# Patient Record
Sex: MALE | Race: WHITE | Employment: FULL TIME | URBAN - METROPOLITAN AREA
[De-identification: names, ages, dates, MRNs, and addresses within clinical notes are randomized per-mention and may not be internally consistent; named-entity substitution may affect disease eponyms.]

---

## 2021-10-21 PROCEDURE — 88305 TISSUE EXAM BY PATHOLOGIST: CPT

## 2021-10-22 ENCOUNTER — HOSPITAL ENCOUNTER (OUTPATIENT)
Dept: LAB | Age: 61
Discharge: HOME | End: 2021-10-22

## 2022-06-23 DIAGNOSIS — Z00.00 WELL ADULT EXAM: ICD-10-CM

## 2022-06-23 DIAGNOSIS — Z13.220 LIPID SCREENING: Primary | ICD-10-CM

## 2022-07-26 ENCOUNTER — OFFICE VISIT (OUTPATIENT)
Dept: FAMILY MEDICINE CLINIC | Facility: CLINIC | Age: 62
End: 2022-07-26
Payer: COMMERCIAL

## 2022-07-26 VITALS
WEIGHT: 132.4 LBS | OXYGEN SATURATION: 96 % | SYSTOLIC BLOOD PRESSURE: 110 MMHG | DIASTOLIC BLOOD PRESSURE: 66 MMHG | RESPIRATION RATE: 16 BRPM | HEIGHT: 70 IN | HEART RATE: 87 BPM | BODY MASS INDEX: 18.96 KG/M2

## 2022-07-26 DIAGNOSIS — R19.7 ACUTE DIARRHEA: ICD-10-CM

## 2022-07-26 DIAGNOSIS — R10.9 ABDOMINAL CRAMPING: ICD-10-CM

## 2022-07-26 DIAGNOSIS — R12 HEARTBURN: ICD-10-CM

## 2022-07-26 DIAGNOSIS — Z87.891 PERSONAL HISTORY OF TOBACCO USE: ICD-10-CM

## 2022-07-26 DIAGNOSIS — R17 JAUNDICE: Primary | ICD-10-CM

## 2022-07-26 DIAGNOSIS — R93.2 ABNORMAL LIVER ULTRASOUND: ICD-10-CM

## 2022-07-26 DIAGNOSIS — R79.89 ELEVATED LFTS: ICD-10-CM

## 2022-07-26 DIAGNOSIS — Z13.220 LIPID SCREENING: ICD-10-CM

## 2022-07-26 LAB
BASOPHILS # BLD: 0.1 K/UL (ref 0–0.2)
BASOPHILS NFR BLD: 1 % (ref 0–2)
BILIRUBIN, URINE, POC: ABNORMAL
BLOOD URINE, POC: NEGATIVE
DIFFERENTIAL METHOD BLD: ABNORMAL
EOSINOPHIL # BLD: 0.2 K/UL (ref 0–0.8)
EOSINOPHIL NFR BLD: 2 % (ref 0.5–7.8)
ERYTHROCYTE [DISTWIDTH] IN BLOOD BY AUTOMATED COUNT: 14.6 % (ref 11.9–14.6)
GLUCOSE URINE, POC: NEGATIVE
HCT VFR BLD AUTO: 44.8 % (ref 41.1–50.3)
HGB BLD-MCNC: 15.3 G/DL (ref 13.6–17.2)
IMM GRANULOCYTES # BLD AUTO: 0 K/UL (ref 0–0.5)
IMM GRANULOCYTES NFR BLD AUTO: 0 % (ref 0–5)
KETONES, URINE, POC: ABNORMAL
LEUKOCYTE ESTERASE, URINE, POC: NEGATIVE
LYMPHOCYTES # BLD: 3.9 K/UL (ref 0.5–4.6)
LYMPHOCYTES NFR BLD: 35 % (ref 13–44)
MCH RBC QN AUTO: 31.7 PG (ref 26.1–32.9)
MCHC RBC AUTO-ENTMCNC: 34.2 G/DL (ref 31.4–35)
MCV RBC AUTO: 92.9 FL (ref 79.6–97.8)
MONOCYTES # BLD: 1.1 K/UL (ref 0.1–1.3)
MONOCYTES NFR BLD: 9 % (ref 4–12)
NEUTS SEG # BLD: 6 K/UL (ref 1.7–8.2)
NEUTS SEG NFR BLD: 53 % (ref 43–78)
NITRITE, URINE, POC: NEGATIVE
NRBC # BLD: 0 K/UL (ref 0–0.2)
PH, URINE, POC: 7 (ref 4.6–8)
PLATELET # BLD AUTO: 323 K/UL (ref 150–450)
PMV BLD AUTO: 10.8 FL (ref 9.4–12.3)
PROTEIN,URINE, POC: NEGATIVE
RBC # BLD AUTO: 4.82 M/UL (ref 4.23–5.6)
SPECIFIC GRAVITY, URINE, POC: 1.02 (ref 1–1.03)
URINALYSIS CLARITY, POC: ABNORMAL
URINALYSIS COLOR, POC: ABNORMAL
UROBILINOGEN, POC: ABNORMAL
WBC # BLD AUTO: 11.3 K/UL (ref 4.3–11.1)

## 2022-07-26 PROCEDURE — 81003 URINALYSIS AUTO W/O SCOPE: CPT | Performed by: FAMILY MEDICINE

## 2022-07-26 PROCEDURE — 99214 OFFICE O/P EST MOD 30 MIN: CPT | Performed by: FAMILY MEDICINE

## 2022-07-26 PROCEDURE — G0296 VISIT TO DETERM LDCT ELIG: HCPCS | Performed by: FAMILY MEDICINE

## 2022-07-26 ASSESSMENT — ENCOUNTER SYMPTOMS
BLOOD IN STOOL: 0
SHORTNESS OF BREATH: 0
COUGH: 0
WHEEZING: 0
ABDOMINAL PAIN: 1
VOMITING: 0
DIARRHEA: 1

## 2022-07-26 NOTE — PROGRESS NOTES
[address] [facility],[address],   [address], [address] [facility] [address]  Ph No:  [phone]  Fax:  [phone]        Assessment/Plan:   Alejandro Maki was seen today for abdominal pain and fatigue. Diagnoses and all orders for this visit:    Jaundice  Noted on exam today with 3+ bilirubin also noted in the urinalysis. During stat ultrasound which he is getting performed tomorrow morning. He has history of alkaline phosphatase elevation and GGT elevation 1 year ago. Unfortunately the ultrasound was not completed at that time as ordered  -     CBC with Auto Differential; Future  -     Comprehensive Metabolic Panel; Future  -     AMB POC URINALYSIS DIP STICK AUTO W/O MICRO  -     TSH; Future  -     Clostridium Difficile Toxin/Antigen; Future  -     Culture, Stool; Future  -     H. Pylori Antigen, Stool; Future  -     US ABDOMEN COMPLETE; Future  -     TSH  -     Comprehensive Metabolic Panel  -     CBC with Auto Differential    Acute diarrhea  Await labs, stool samples. He did have recent antibiotic use which could be contributory, will assess for C. difficile. -     CBC with Auto Differential; Future  -     Comprehensive Metabolic Panel; Future  -     AMB POC URINALYSIS DIP STICK AUTO W/O MICRO  -     TSH; Future  -     Clostridium Difficile Toxin/Antigen; Future  -     Culture, Stool; Future  -     H. Pylori Antigen, Stool; Future  -     US ABDOMEN COMPLETE; Future  -     TSH  -     Comprehensive Metabolic Panel  -     CBC with Auto Differential    Abdominal cramping  Await labs, stool specimens. -     CBC with Auto Differential; Future  -     Comprehensive Metabolic Panel; Future  -     AMB POC URINALYSIS DIP STICK AUTO W/O MICRO  -     TSH; Future  -     Clostridium Difficile Toxin/Antigen; Future  -     Culture, Stool; Future  -     H. Pylori Antigen, Stool; Future  -     TSH  -     Comprehensive Metabolic Panel  -     CBC with Auto Differential    Heartburn  Continue Mylanta.   May need to start a PPI, but at this time await work-up for acute symptoms  -     CBC with Auto Differential; Future  -     Comprehensive Metabolic Panel; Future  -     AMB POC URINALYSIS DIP STICK AUTO W/O MICRO  -     TSH; Future  -     Clostridium Difficile Toxin/Antigen; Future  -     Culture, Stool; Future  -     H. Pylori Antigen, Stool; Future  -     TSH  -     Comprehensive Metabolic Panel  -     CBC with Auto Differential    Personal history of tobacco use  48-year pack history of smoking. He is agreeable to lung cancer screening.  -     UT VISIT TO DISCUSS LUNG CA SCREEN W LDCT  -     CT Lung Screen (Annual); Future    Lipid screening  -     Lipid Panel      Labs:  Results for orders placed or performed in visit on 07/26/22   AMB POC URINALYSIS DIP STICK AUTO W/O MICRO   Result Value Ref Range    Color (UA POC) Teodora     Clarity (UA POC) Slightly Cloudy     Glucose, Urine, POC Negative Negative    Bilirubin, Urine, POC Small Negative    KETONES, Urine, POC Trace Negative    Specific Gravity, Urine, POC 1.020 1.001 - 1.035    Blood (UA POC) Negative Negative    pH, Urine, POC 7.0 4.6 - 8.0    Protein, Urine, POC Negative Negative    Urobilinogen, POC 0.2 mg/dL     Nitrite, Urine, POC Negative Negative    Leukocyte Esterase, Urine, POC Negative Negative               Alissa Aburto is a 60 y.o. male who is seen for evaluation of   Chief Complaint   Patient presents with    Abdominal Pain     Low abdominal pain. Loose yellow stools. GERD  Urine is dark yellow. Onset 7 days. Fatigue       HPI:   Is here today with acute symptoms including diarrhea which is loose to watery in nature and yellow. This started a week ago. He is having some cramping and some heartburn. Neither 1 of these are severe. He is noting weight loss, fatigue. He also noted that his urine was dark yellow to orange. He recently had antibiotic use for a dental procedure when she had 2 teeth pulled.   He is back to eating normal foods, but he still does not feel well. He is been using Mylanta as needed for heartburn. He has felt well until a week ago. Last year he had elevated alkaline phosphatase on labs. A ultrasound was ordered but this was not completed. Review of Systems:  Review of Systems   Constitutional:  Positive for fatigue and unexpected weight change. Negative for chills and fever. Respiratory:  Negative for cough, shortness of breath and wheezing. Cardiovascular:  Negative for chest pain and palpitations. Gastrointestinal:  Positive for abdominal pain and diarrhea. Negative for blood in stool and vomiting. Psychiatric/Behavioral:  The patient is not nervous/anxious. All other systems reviewed and are negative. History:  Past Medical History:   Diagnosis Date    Tobacco abuse 8/22/2016    Tubular adenoma of colon        Past Surgical History:   Procedure Laterality Date    COLONOSCOPY  10/2021    1 yr recall        No current outpatient medications on file. No current facility-administered medications for this visit. Immunization History   Administered Date(s) Administered    COVID-19, PFIZER PURPLE top, DILUTE for use, (age 15 y+), 30mcg/0.3mL 03/13/2021, 04/10/2021    Influenza, MDCK Quadv, with preserv IM (Flucelvax 2 yrs and older) 10/18/2019    Pneumococcal Polysaccharide ([id number]) 06/28/2021    Td (Adult), 2 Lf Tetanus Toxoid, Pf (Td, Absorbed) 08/22/2016    Td vaccine (adult) 03/04/2009    Zoster Recombinant (Shingrix) 06/28/2021             Vitals:    Vitals:    07/26/22 1411   BP: 110/66   Site: Left Upper Arm   Position: Sitting   Pulse: 87   Resp: 16   SpO2: 96%   Weight: 132 lb 6.4 oz (60.1 kg)   Height: 5' 10\" (1.778 m)          Physical Exam:  Physical Exam  Vitals reviewed. Constitutional:       Appearance: Normal appearance. HENT:      Head: Normocephalic and atraumatic. Eyes:      General: Scleral icterus present.    Cardiovascular:      Rate and Rhythm: Normal rate and regular rhythm. Heart sounds: No murmur heard. Pulmonary:      Effort: Pulmonary effort is normal. No respiratory distress. Breath sounds: No wheezing. Abdominal:      General: There is no distension. Palpations: There is no mass. Tenderness: There is no abdominal tenderness. There is no right CVA tenderness, left CVA tenderness, guarding or rebound. Hernia: No hernia is present. Musculoskeletal:      Cervical back: Neck supple. Right lower leg: No edema. Left lower leg: No edema. Skin:     General: Skin is warm and dry. Coloration: Skin is jaundiced. Neurological:      Mental Status: He is alert. Psychiatric:         Mood and Affect: Mood normal.         Behavior: Behavior normal.           Sophia Bermudez DO    This note was generated using Dragon voice recognition software.   There may be medical errors due to computer generated translation

## 2022-07-27 ENCOUNTER — HOSPITAL ENCOUNTER (OUTPATIENT)
Dept: ULTRASOUND IMAGING | Age: 62
Discharge: HOME | End: 2022-07-30

## 2022-07-27 DIAGNOSIS — R17 JAUNDICE: ICD-10-CM

## 2022-07-27 DIAGNOSIS — R79.89 ELEVATED LFTS: ICD-10-CM

## 2022-07-27 DIAGNOSIS — R19.7 ACUTE DIARRHEA: ICD-10-CM

## 2022-07-27 LAB
ALBUMIN SERPL-MCNC: 3.7 G/DL (ref 3.2–4.6)
ALBUMIN/GLOB SERPL: 1.4 {RATIO} (ref 1.2–3.5)
ALP SERPL-CCNC: 892 U/L (ref 50–136)
ALT SERPL-CCNC: 928 U/L (ref 12–65)
ANION GAP SERPL CALC-SCNC: 6 MMOL/L (ref 7–16)
AST SERPL-CCNC: 476 U/L (ref 15–37)
BILIRUB SERPL-MCNC: 8.5 MG/DL (ref 0.2–1.1)
BUN SERPL-MCNC: 12 MG/DL (ref 8–23)
CALCIUM SERPL-MCNC: 9.6 MG/DL (ref 8.3–10.4)
CHLORIDE SERPL-SCNC: 107 MMOL/L (ref 98–107)
CHOLEST SERPL-MCNC: 229 MG/DL
CO2 SERPL-SCNC: 29 MMOL/L (ref 21–32)
CREAT SERPL-MCNC: 0.9 MG/DL (ref 0.8–1.5)
GLOBULIN SER CALC-MCNC: 2.6 G/DL (ref 2.3–3.5)
GLUCOSE SERPL-MCNC: 89 MG/DL (ref 65–100)
HAV IGM SER QL: NONREACTIVE
HBV CORE IGM SER QL: NONREACTIVE
HBV SURFACE AG SER QL: NONREACTIVE
HCV AB SER QL: NONREACTIVE
HDLC SERPL-MCNC: 24 MG/DL (ref 40–60)
HDLC SERPL: 9.5 {RATIO}
LDLC SERPL CALC-MCNC: 183.8 MG/DL
POTASSIUM SERPL-SCNC: 4.6 MMOL/L (ref 3.5–5.1)
PROT SERPL-MCNC: 6.3 G/DL (ref 6.3–8.2)
SODIUM SERPL-SCNC: 142 MMOL/L (ref 136–145)
TRIGL SERPL-MCNC: 106 MG/DL (ref 35–150)
TSH, 3RD GENERATION: 1.8 UIU/ML (ref 0.36–3.74)
VLDLC SERPL CALC-MCNC: 21.2 MG/DL (ref 6–23)

## 2022-07-28 DIAGNOSIS — R17 JAUNDICE: ICD-10-CM

## 2022-07-28 DIAGNOSIS — R10.9 ABDOMINAL CRAMPING: ICD-10-CM

## 2022-07-28 DIAGNOSIS — R19.7 ACUTE DIARRHEA: ICD-10-CM

## 2022-07-28 DIAGNOSIS — R12 HEARTBURN: ICD-10-CM

## 2022-07-29 LAB
C DIFF GDH STL QL: NORMAL
C DIFF TOX A+B STL QL IA: NORMAL
C DIFF TOXIN INTERPRETATION: NORMAL
CLINICAL CONSIDERATION: NORMAL
REFLEX: NORMAL

## 2022-07-30 LAB
H PYLORI AG STL QL IA: NEGATIVE
SPECIMEN SOURCE: NORMAL

## 2022-07-31 LAB
BACTERIA SPEC CULT: NORMAL
SERVICE CMNT-IMP: NORMAL

## 2022-08-04 ENCOUNTER — TELEPHONE (OUTPATIENT)
Dept: FAMILY MEDICINE CLINIC | Facility: CLINIC | Age: 62
End: 2022-08-04

## 2022-08-04 DIAGNOSIS — R16.0 LIVER MASS: Primary | ICD-10-CM

## 2022-08-04 NOTE — TELEPHONE ENCOUNTER
Sidney Mccormick. [facility] Department, called and stated the CT abd/pelvis, CPT 32425, has been denied. Per Delonte Leo, the insurance suggested changing to CT abd with contrast, CPT 35681. Delonte Leo wanted to know if the CT can be changed, it so please put in a new order with correct CPT. Low Dose CT of Lung has been approved. Delonte Leo requested call back today, [phone]  Pt imaging is scheduled for tomorrow.

## 2022-08-05 ENCOUNTER — HOSPITAL ENCOUNTER (OUTPATIENT)
Dept: CT IMAGING | Age: 62
Discharge: HOME | End: 2022-08-08
Payer: COMMERCIAL

## 2022-08-05 ENCOUNTER — APPOINTMENT (OUTPATIENT)
Dept: CT IMAGING | Age: 62
End: 2022-08-05
Payer: COMMERCIAL

## 2022-08-05 VITALS — HEIGHT: 70 IN | WEIGHT: 130 LBS | BODY MASS INDEX: 18.61 KG/M2

## 2022-08-05 DIAGNOSIS — R16.0 LIVER MASS: ICD-10-CM

## 2022-08-05 DIAGNOSIS — Z87.891 PERSONAL HISTORY OF TOBACCO USE: ICD-10-CM

## 2022-08-05 PROCEDURE — 6360000004 HC RX CONTRAST MEDICATION: Performed by: FAMILY MEDICINE

## 2022-08-05 PROCEDURE — 2580000003 HC RX 258: Performed by: FAMILY MEDICINE

## 2022-08-05 PROCEDURE — 74160 CT ABDOMEN W/CONTRAST: CPT

## 2022-08-05 PROCEDURE — 71271 CT THORAX LUNG CANCER SCR C-: CPT

## 2022-08-05 RX ORDER — SODIUM CHLORIDE 0.9 % (FLUSH) 0.9 %
10 SYRINGE (ML) INJECTION
Status: COMPLETED | OUTPATIENT
Start: 2022-08-05 | End: 2022-08-05

## 2022-08-05 RX ORDER — 0.9 % SODIUM CHLORIDE 0.9 %
100 INTRAVENOUS SOLUTION INTRAVENOUS
Status: COMPLETED | OUTPATIENT
Start: 2022-08-05 | End: 2022-08-05

## 2022-08-05 RX ADMIN — SODIUM CHLORIDE 100 ML: 9 INJECTION, SOLUTION INTRAVENOUS at 15:16

## 2022-08-05 RX ADMIN — IOPAMIDOL 100 ML: 755 INJECTION, SOLUTION INTRAVENOUS at 15:15

## 2022-08-05 RX ADMIN — DIATRIZOATE MEGLUMINE AND DIATRIZOATE SODIUM 15 ML: 660; 100 LIQUID ORAL; RECTAL at 15:16

## 2022-08-05 RX ADMIN — SODIUM CHLORIDE, PRESERVATIVE FREE 10 ML: 5 INJECTION INTRAVENOUS at 15:16

## 2022-08-09 ENCOUNTER — PREP FOR PROCEDURE (OUTPATIENT)
Dept: GASTROENTEROLOGY | Age: 62
End: 2022-08-09

## 2022-08-09 ENCOUNTER — OFFICE VISIT (OUTPATIENT)
Dept: GASTROENTEROLOGY | Age: 62
End: 2022-08-09
Payer: COMMERCIAL

## 2022-08-09 VITALS
HEIGHT: 70 IN | DIASTOLIC BLOOD PRESSURE: 67 MMHG | HEART RATE: 88 BPM | TEMPERATURE: 98.7 F | SYSTOLIC BLOOD PRESSURE: 103 MMHG | WEIGHT: 126 LBS | BODY MASS INDEX: 18.04 KG/M2

## 2022-08-09 DIAGNOSIS — C80.1 OBSTRUCTIVE JAUNDICE DUE TO CANCER (HCC): Primary | ICD-10-CM

## 2022-08-09 DIAGNOSIS — K83.1 OBSTRUCTIVE JAUNDICE DUE TO CANCER (HCC): ICD-10-CM

## 2022-08-09 DIAGNOSIS — K83.1 OBSTRUCTIVE JAUNDICE DUE TO CANCER (HCC): Primary | ICD-10-CM

## 2022-08-09 DIAGNOSIS — C80.1 OBSTRUCTIVE JAUNDICE DUE TO CANCER (HCC): ICD-10-CM

## 2022-08-09 DIAGNOSIS — R93.2 ABNORMAL CT OF LIVER: ICD-10-CM

## 2022-08-09 LAB — CEA SERPL-MCNC: 4.7 NG/ML (ref 0–3)

## 2022-08-09 PROCEDURE — 99203 OFFICE O/P NEW LOW 30 MIN: CPT | Performed by: INTERNAL MEDICINE

## 2022-08-09 RX ORDER — SODIUM CHLORIDE 0.9 % (FLUSH) 0.9 %
5-40 SYRINGE (ML) INJECTION EVERY 12 HOURS SCHEDULED
Status: CANCELLED | OUTPATIENT
Start: 2022-08-09

## 2022-08-09 RX ORDER — SODIUM CHLORIDE 0.9 % (FLUSH) 0.9 %
5-40 SYRINGE (ML) INJECTION PRN
Status: CANCELLED | OUTPATIENT
Start: 2022-08-09

## 2022-08-09 RX ORDER — SODIUM CHLORIDE 9 MG/ML
25 INJECTION, SOLUTION INTRAVENOUS PRN
Status: CANCELLED | OUTPATIENT
Start: 2022-08-09

## 2022-08-09 ASSESSMENT — ENCOUNTER SYMPTOMS
COLOR CHANGE: 0
ABDOMINAL PAIN: 1
BLOOD IN STOOL: 0
VOMITING: 0
SHORTNESS OF BREATH: 0
TROUBLE SWALLOWING: 0

## 2022-08-09 NOTE — PROGRESS NOTES
iKka Eden (:  1960) is a 60 y.o. male, new patient here for evaluation of the following chief complaint(s): Other and GI Problem (Discolored stool, abdominal discomfort)         ASSESSMENT/PLAN:  1. Obstructive jaundice due to cancer ([address]  2. Abnormal CT of liver    Proceed with ERCP evaluation for palliative drainage and diagnostic cholangioscopy to see if biopsy of the lesion is possible. If not he will need endoscopic ultrasound and fine-needle aspiration for diagnostic purposes. I will also get his serum alpha-fetoprotein CEA and CA 19-9. The invasion of the left portal vein is a poor prognostic criteria. Subjective   SUBJECTIVE/OBJECTIVE  Presented with painless jaundice over the past 3 weeks. Has been complaining of generalized with fatigue decreased appetite lost around 10 pounds. His liver enzymes were noted to be elevated and ultrasound was suspicious for mass lesion. CT scan of abdomen showed a 4 cm enhancing lesion in segment 4/8,occluding the left intrahepatic portal vein, with IHDs dilation,starting at CHD origin. Past Medical History:   Diagnosis Date    Tobacco abuse 2016    Tubular adenoma of colon        Past Surgical History:   Procedure Laterality Date    COLONOSCOPY  10/2021    1 yr recall              Allergies   Allergen Reactions    Naproxen Sodium Rash          Review of Systems   Constitutional:  Negative for appetite change. HENT:  Negative for mouth sores and trouble swallowing. Respiratory:  Negative for shortness of breath. Cardiovascular:  Negative for chest pain. Gastrointestinal:  Positive for abdominal pain. Negative for blood in stool and vomiting. Skin:  Positive for pallor. Negative for color change. Allergic/Immunologic: Negative for food allergies. Neurological:  Negative for seizures and weakness. Hematological:  Does not bruise/bleed easily. Objective   Physical Exam  HENT:      Head: Normocephalic. Mouth/Throat:      Mouth: Mucous membranes are moist.   Eyes:      General: Scleral icterus present. Cardiovascular:      Rate and Rhythm: Normal rate and regular rhythm. Pulmonary:      Effort: No respiratory distress. Abdominal:      General: There is no distension. Tenderness: There is abdominal tenderness. There is no rebound. Genitourinary:     Rectum: Normal.   Lymphadenopathy:      Cervical: No cervical adenopathy. Skin:     Coloration: Skin is not jaundiced. Findings: No bruising. Neurological:      General: No focal deficit present. Mental Status: He is alert. No current outpatient medications on file. No current facility-administered medications for this visit. Family History   Problem Relation Age of Onset    Cancer Brother 39        Esophageal    Cancer Sister 61        Breast    No Known Problems Father     Cancer Mother 79        breast       Return for endoscopy. An electronic signature was used to authenticate this note.     --Tiffany Jarrett MD

## 2022-08-09 NOTE — H&P (VIEW-ONLY)
Radha Carballo (:  1960) is a 60 y.o. male, new patient here for evaluation of the following chief complaint(s): Other and GI Problem (Discolored stool, abdominal discomfort)         ASSESSMENT/PLAN:  1. Obstructive jaundice due to cancer ([address]  2. Abnormal CT of liver    Proceed with ERCP evaluation for palliative drainage and diagnostic cholangioscopy to see if biopsy of the lesion is possible. If not he will need endoscopic ultrasound and fine-needle aspiration for diagnostic purposes. I will also get his serum alpha-fetoprotein CEA and CA 19-9. The invasion of the left portal vein is a poor prognostic criteria. Subjective   SUBJECTIVE/OBJECTIVE  Presented with painless jaundice over the past 3 weeks. Has been complaining of generalized with fatigue decreased appetite lost around 10 pounds. His liver enzymes were noted to be elevated and ultrasound was suspicious for mass lesion. CT scan of abdomen showed a 4 cm enhancing lesion in segment 4/8,occluding the left intrahepatic portal vein, with IHDs dilation,starting at CHD origin. Past Medical History:   Diagnosis Date    Tobacco abuse 2016    Tubular adenoma of colon        Past Surgical History:   Procedure Laterality Date    COLONOSCOPY  10/2021    1 yr recall              Allergies   Allergen Reactions    Naproxen Sodium Rash          Review of Systems   Constitutional:  Negative for appetite change. HENT:  Negative for mouth sores and trouble swallowing. Respiratory:  Negative for shortness of breath. Cardiovascular:  Negative for chest pain. Gastrointestinal:  Positive for abdominal pain. Negative for blood in stool and vomiting. Skin:  Positive for pallor. Negative for color change. Allergic/Immunologic: Negative for food allergies. Neurological:  Negative for seizures and weakness. Hematological:  Does not bruise/bleed easily. Objective   Physical Exam  HENT:      Head: Normocephalic. Mouth/Throat:      Mouth: Mucous membranes are moist.   Eyes:      General: Scleral icterus present. Cardiovascular:      Rate and Rhythm: Normal rate and regular rhythm. Pulmonary:      Effort: No respiratory distress. Abdominal:      General: There is no distension. Tenderness: There is abdominal tenderness. There is no rebound. Genitourinary:     Rectum: Normal.   Lymphadenopathy:      Cervical: No cervical adenopathy. Skin:     Coloration: Skin is not jaundiced. Findings: No bruising. Neurological:      General: No focal deficit present. Mental Status: He is alert. No current outpatient medications on file. No current facility-administered medications for this visit. Family History   Problem Relation Age of Onset    Cancer Brother 39        Esophageal    Cancer Sister 61        Breast    No Known Problems Father     Cancer Mother 79        breast       Return for endoscopy. An electronic signature was used to authenticate this note.     --Tala Stewart MD

## 2022-08-10 ENCOUNTER — TELEPHONE (OUTPATIENT)
Dept: FAMILY MEDICINE CLINIC | Facility: CLINIC | Age: 62
End: 2022-08-10

## 2022-08-10 PROBLEM — K83.1 OBSTRUCTIVE JAUNDICE DUE TO CANCER (HCC): Status: ACTIVE | Noted: 2022-08-10

## 2022-08-10 PROBLEM — R93.2 ABNORMAL CT SCAN, LIVER: Status: ACTIVE | Noted: 2022-01-01

## 2022-08-10 PROBLEM — C80.1 OBSTRUCTIVE JAUNDICE DUE TO CANCER (HCC): Status: ACTIVE | Noted: 2022-08-10

## 2022-08-10 LAB
AFP-TM SERPL-MCNC: 7.7 NG/ML
CANCER AG19-9 SERPL-ACNC: <2 U/ML (ref 2–37)

## 2022-08-10 RX ORDER — ONDANSETRON 4 MG/1
4 TABLET, FILM COATED ORAL 2 TIMES DAILY PRN
Qty: 60 TABLET | Refills: 0 | Status: SHIPPED | OUTPATIENT
Start: 2022-08-10

## 2022-08-10 NOTE — TELEPHONE ENCOUNTER
Pt call and said he had talk to you about nausea medication and that he would get back with you. He would like this sent to [facility]. He did not know the name of medication.

## 2022-08-11 NOTE — PERIOP NOTE
Patient verified name, , and procedure. Type: 1a; abbreviated assessment per anesthesia guidelines  Labs per surgeon: none  Labs per anesthesia: none      Instructed pt that they will be notified by the Gi Lab for time of arrival. If any questions please call the GI lab at [phone]. Follow diet and prep instructions per office. May have clear liquids until 4 hours prior to time of arrival.    [facility] or shower the night before and the am of surgery with antibacterial soap. No lotions, oils, powders, cologne on skin. No make up, eye make up or jewelry. Wear loose fitting comfortable, clean clothing. Must have adult present in building the entire time . Medications for the day of procedure  Zofran (ondansetron) if needed     The following discharge instructions reviewed with patient: medication given during procedure may cause drowsiness for several hours, therefore, do not drive or operate machinery for remainder of the day, no alcohol on the day of your procedure, resume regular diet and activity unless otherwise directed, for mild sore throat you may use Cepacol throat lozenges or warm salt water gargles as needed, call your physician for any problems or questions. Patient verbalizes understanding.

## 2022-08-12 NOTE — PROGRESS NOTES
Spoke with pt regarding Monday's procedure. Pt verbalized understanding of early arrival time as well as  policy.

## 2022-08-15 ENCOUNTER — APPOINTMENT (OUTPATIENT)
Dept: GENERAL RADIOLOGY | Age: 62
End: 2022-08-15
Attending: INTERNAL MEDICINE
Payer: COMMERCIAL

## 2022-08-15 ENCOUNTER — ANESTHESIA (OUTPATIENT)
Dept: ENDOSCOPY | Age: 62
End: 2022-08-15
Payer: COMMERCIAL

## 2022-08-15 ENCOUNTER — ANESTHESIA EVENT (OUTPATIENT)
Dept: ENDOSCOPY | Age: 62
End: 2022-08-15
Payer: COMMERCIAL

## 2022-08-15 ENCOUNTER — HOSPITAL ENCOUNTER (OUTPATIENT)
Age: 62
Setting detail: OUTPATIENT SURGERY
Discharge: HOME | End: 2022-08-15
Attending: INTERNAL MEDICINE | Admitting: INTERNAL MEDICINE
Payer: COMMERCIAL

## 2022-08-15 VITALS
TEMPERATURE: 98 F | OXYGEN SATURATION: 96 % | HEIGHT: 70 IN | SYSTOLIC BLOOD PRESSURE: 124 MMHG | BODY MASS INDEX: 17.75 KG/M2 | DIASTOLIC BLOOD PRESSURE: 75 MMHG | RESPIRATION RATE: 20 BRPM | WEIGHT: 124 LBS | HEART RATE: 78 BPM

## 2022-08-15 DIAGNOSIS — K83.1 OBSTRUCTIVE JAUNDICE DUE TO CANCER (HCC): ICD-10-CM

## 2022-08-15 DIAGNOSIS — C80.1 OBSTRUCTIVE JAUNDICE DUE TO CANCER (HCC): ICD-10-CM

## 2022-08-15 DIAGNOSIS — R93.2 ABNORMAL CT SCAN, LIVER: ICD-10-CM

## 2022-08-15 PROCEDURE — 3700000001 HC ADD 15 MINUTES (ANESTHESIA): Performed by: INTERNAL MEDICINE

## 2022-08-15 PROCEDURE — 2500000003 HC RX 250 WO HCPCS

## 2022-08-15 PROCEDURE — 6360000004 HC RX CONTRAST MEDICATION: Performed by: INTERNAL MEDICINE

## 2022-08-15 PROCEDURE — 2580000003 HC RX 258: Performed by: ANESTHESIOLOGY

## 2022-08-15 PROCEDURE — C1769 GUIDE WIRE: HCPCS | Performed by: INTERNAL MEDICINE

## 2022-08-15 PROCEDURE — 88112 CYTOPATH CELL ENHANCE TECH: CPT

## 2022-08-15 PROCEDURE — 3700000000 HC ANESTHESIA ATTENDED CARE: Performed by: INTERNAL MEDICINE

## 2022-08-15 PROCEDURE — 6360000002 HC RX W HCPCS

## 2022-08-15 PROCEDURE — 7100000011 HC PHASE II RECOVERY - ADDTL 15 MIN: Performed by: INTERNAL MEDICINE

## 2022-08-15 PROCEDURE — C2625 STENT, NON-COR, TEM W/DEL SY: HCPCS | Performed by: INTERNAL MEDICINE

## 2022-08-15 PROCEDURE — 7100000010 HC PHASE II RECOVERY - FIRST 15 MIN: Performed by: INTERNAL MEDICINE

## 2022-08-15 PROCEDURE — 74330 X-RAY BILE/PANC ENDOSCOPY: CPT

## 2022-08-15 PROCEDURE — 3609015200 HC ERCP REMOVE CALCULI/DEBRIS BILIARY/PANCREAS DUCT: Performed by: INTERNAL MEDICINE

## 2022-08-15 PROCEDURE — 88305 TISSUE EXAM BY PATHOLOGIST: CPT

## 2022-08-15 PROCEDURE — 2720000010 HC SURG SUPPLY STERILE: Performed by: INTERNAL MEDICINE

## 2022-08-15 PROCEDURE — 2709999900 HC NON-CHARGEABLE SUPPLY: Performed by: INTERNAL MEDICINE

## 2022-08-15 PROCEDURE — 6360000002 HC RX W HCPCS: Performed by: INTERNAL MEDICINE

## 2022-08-15 DEVICE — BILIARY STENT WITH NAVIFLEXTM RX DELIVERY SYSTEM
Type: IMPLANTABLE DEVICE | Site: BILE DUCT | Status: FUNCTIONAL
Brand: ADVANIX™ BILIARY

## 2022-08-15 RX ORDER — SODIUM CHLORIDE, SODIUM LACTATE, POTASSIUM CHLORIDE, CALCIUM CHLORIDE 600; 310; 30; 20 MG/100ML; MG/100ML; MG/100ML; MG/100ML
INJECTION, SOLUTION INTRAVENOUS CONTINUOUS
Status: DISCONTINUED | OUTPATIENT
Start: 2022-08-15 | End: 2022-08-15 | Stop reason: HOSPADM

## 2022-08-15 RX ORDER — SODIUM CHLORIDE 0.9 % (FLUSH) 0.9 %
5-40 SYRINGE (ML) INJECTION EVERY 12 HOURS SCHEDULED
Status: DISCONTINUED | OUTPATIENT
Start: 2022-08-15 | End: 2022-08-15 | Stop reason: HOSPADM

## 2022-08-15 RX ORDER — CIPROFLOXACIN 2 MG/ML
400 INJECTION, SOLUTION INTRAVENOUS ONCE
Status: COMPLETED | OUTPATIENT
Start: 2022-08-15 | End: 2022-08-15

## 2022-08-15 RX ORDER — SODIUM CHLORIDE 0.9 % (FLUSH) 0.9 %
5-40 SYRINGE (ML) INJECTION PRN
Status: DISCONTINUED | OUTPATIENT
Start: 2022-08-15 | End: 2022-08-15 | Stop reason: HOSPADM

## 2022-08-15 RX ORDER — CIPROFLOXACIN 2 MG/ML
400 INJECTION, SOLUTION INTRAVENOUS EVERY 12 HOURS
Status: DISCONTINUED | OUTPATIENT
Start: 2022-08-15 | End: 2022-08-15

## 2022-08-15 RX ORDER — SODIUM CHLORIDE 9 MG/ML
25 INJECTION, SOLUTION INTRAVENOUS PRN
Status: DISCONTINUED | OUTPATIENT
Start: 2022-08-15 | End: 2022-08-15 | Stop reason: HOSPADM

## 2022-08-15 RX ORDER — PROCHLORPERAZINE EDISYLATE 5 MG/ML
5 INJECTION INTRAMUSCULAR; INTRAVENOUS
Status: DISCONTINUED | OUTPATIENT
Start: 2022-08-15 | End: 2022-08-15 | Stop reason: HOSPADM

## 2022-08-15 RX ORDER — HYDROMORPHONE HYDROCHLORIDE 2 MG/ML
0.5 INJECTION, SOLUTION INTRAMUSCULAR; INTRAVENOUS; SUBCUTANEOUS EVERY 5 MIN PRN
Status: DISCONTINUED | OUTPATIENT
Start: 2022-08-15 | End: 2022-08-15 | Stop reason: HOSPADM

## 2022-08-15 RX ORDER — ACETAMINOPHEN 500 MG
1000 TABLET ORAL ONCE
Status: DISCONTINUED | OUTPATIENT
Start: 2022-08-15 | End: 2022-08-15

## 2022-08-15 RX ORDER — FENTANYL CITRATE 50 UG/ML
INJECTION, SOLUTION INTRAMUSCULAR; INTRAVENOUS PRN
Status: DISCONTINUED | OUTPATIENT
Start: 2022-08-15 | End: 2022-08-15 | Stop reason: SDUPTHER

## 2022-08-15 RX ORDER — GLUCAGON 1 MG/ML
1 KIT INJECTION PRN
Status: CANCELLED | OUTPATIENT
Start: 2022-08-15

## 2022-08-15 RX ORDER — DEXAMETHASONE SODIUM PHOSPHATE 10 MG/ML
INJECTION INTRAMUSCULAR; INTRAVENOUS PRN
Status: DISCONTINUED | OUTPATIENT
Start: 2022-08-15 | End: 2022-08-15 | Stop reason: SDUPTHER

## 2022-08-15 RX ORDER — EPHEDRINE SULFATE/0.9% NACL/PF 50 MG/5 ML
SYRINGE (ML) INTRAVENOUS PRN
Status: DISCONTINUED | OUTPATIENT
Start: 2022-08-15 | End: 2022-08-15 | Stop reason: SDUPTHER

## 2022-08-15 RX ORDER — OXYCODONE HYDROCHLORIDE 5 MG/1
5 TABLET ORAL
Status: DISCONTINUED | OUTPATIENT
Start: 2022-08-15 | End: 2022-08-15 | Stop reason: HOSPADM

## 2022-08-15 RX ORDER — ONDANSETRON 2 MG/ML
4 INJECTION INTRAMUSCULAR; INTRAVENOUS
Status: DISCONTINUED | OUTPATIENT
Start: 2022-08-15 | End: 2022-08-15 | Stop reason: HOSPADM

## 2022-08-15 RX ORDER — MEPERIDINE HYDROCHLORIDE 25 MG/ML
12.5 INJECTION INTRAMUSCULAR; INTRAVENOUS; SUBCUTANEOUS EVERY 5 MIN PRN
Status: DISCONTINUED | OUTPATIENT
Start: 2022-08-15 | End: 2022-08-15 | Stop reason: HOSPADM

## 2022-08-15 RX ORDER — SODIUM CHLORIDE 9 MG/ML
INJECTION, SOLUTION INTRAVENOUS PRN
Status: DISCONTINUED | OUTPATIENT
Start: 2022-08-15 | End: 2022-08-15 | Stop reason: HOSPADM

## 2022-08-15 RX ORDER — MIDAZOLAM HYDROCHLORIDE 2 MG/2ML
2 INJECTION, SOLUTION INTRAMUSCULAR; INTRAVENOUS
Status: DISCONTINUED | OUTPATIENT
Start: 2022-08-15 | End: 2022-08-15 | Stop reason: HOSPADM

## 2022-08-15 RX ORDER — LIDOCAINE HYDROCHLORIDE 20 MG/ML
INJECTION, SOLUTION EPIDURAL; INFILTRATION; INTRACAUDAL; PERINEURAL PRN
Status: DISCONTINUED | OUTPATIENT
Start: 2022-08-15 | End: 2022-08-15 | Stop reason: SDUPTHER

## 2022-08-15 RX ORDER — ROCURONIUM BROMIDE 10 MG/ML
INJECTION, SOLUTION INTRAVENOUS PRN
Status: DISCONTINUED | OUTPATIENT
Start: 2022-08-15 | End: 2022-08-15 | Stop reason: SDUPTHER

## 2022-08-15 RX ORDER — ONDANSETRON 2 MG/ML
INJECTION INTRAMUSCULAR; INTRAVENOUS PRN
Status: DISCONTINUED | OUTPATIENT
Start: 2022-08-15 | End: 2022-08-15 | Stop reason: SDUPTHER

## 2022-08-15 RX ORDER — SUCCINYLCHOLINE CHLORIDE 20 MG/ML
INJECTION INTRAMUSCULAR; INTRAVENOUS PRN
Status: DISCONTINUED | OUTPATIENT
Start: 2022-08-15 | End: 2022-08-15 | Stop reason: SDUPTHER

## 2022-08-15 RX ORDER — LIDOCAINE HYDROCHLORIDE 10 MG/ML
1 INJECTION, SOLUTION INFILTRATION; PERINEURAL
Status: DISCONTINUED | OUTPATIENT
Start: 2022-08-15 | End: 2022-08-15 | Stop reason: HOSPADM

## 2022-08-15 RX ORDER — PROPOFOL 10 MG/ML
INJECTION, EMULSION INTRAVENOUS PRN
Status: DISCONTINUED | OUTPATIENT
Start: 2022-08-15 | End: 2022-08-15 | Stop reason: SDUPTHER

## 2022-08-15 RX ADMIN — GLUCAGON HYDROCHLORIDE 0.5 MG: KIT at 10:21

## 2022-08-15 RX ADMIN — Medication 120 MG: at 09:52

## 2022-08-15 RX ADMIN — GLUCAGON HYDROCHLORIDE 0.5 MG: KIT at 11:14

## 2022-08-15 RX ADMIN — CIPROFLOXACIN 400 MG: 2 INJECTION, SOLUTION INTRAVENOUS at 09:33

## 2022-08-15 RX ADMIN — ONDANSETRON 4 MG: 2 INJECTION INTRAMUSCULAR; INTRAVENOUS at 11:57

## 2022-08-15 RX ADMIN — SODIUM CHLORIDE, SODIUM LACTATE, POTASSIUM CHLORIDE, AND CALCIUM CHLORIDE: 600; 310; 30; 20 INJECTION, SOLUTION INTRAVENOUS at 11:53

## 2022-08-15 RX ADMIN — IOPAMIDOL 7 ML: 755 INJECTION, SOLUTION INTRAVENOUS at 12:11

## 2022-08-15 RX ADMIN — FENTANYL CITRATE 50 MCG: 50 INJECTION, SOLUTION INTRAMUSCULAR; INTRAVENOUS at 09:44

## 2022-08-15 RX ADMIN — FENTANYL CITRATE 25 MCG: 50 INJECTION, SOLUTION INTRAMUSCULAR; INTRAVENOUS at 10:56

## 2022-08-15 RX ADMIN — DEXAMETHASONE SODIUM PHOSPHATE 10 MG: 10 INJECTION INTRAMUSCULAR; INTRAVENOUS at 10:18

## 2022-08-15 RX ADMIN — LIDOCAINE HYDROCHLORIDE 60 MG: 20 INJECTION, SOLUTION EPIDURAL; INFILTRATION; INTRACAUDAL; PERINEURAL at 09:51

## 2022-08-15 RX ADMIN — SODIUM CHLORIDE, SODIUM LACTATE, POTASSIUM CHLORIDE, AND CALCIUM CHLORIDE: 600; 310; 30; 20 INJECTION, SOLUTION INTRAVENOUS at 09:05

## 2022-08-15 RX ADMIN — GLUCAGON HYDROCHLORIDE 0.5 MG: KIT at 11:26

## 2022-08-15 RX ADMIN — Medication 10 MG: at 10:15

## 2022-08-15 RX ADMIN — ROCURONIUM BROMIDE 5 MG: 50 INJECTION, SOLUTION INTRAVENOUS at 09:51

## 2022-08-15 RX ADMIN — FENTANYL CITRATE 25 MCG: 50 INJECTION, SOLUTION INTRAMUSCULAR; INTRAVENOUS at 10:44

## 2022-08-15 RX ADMIN — PROPOFOL 140 MG: 10 INJECTION, EMULSION INTRAVENOUS at 09:51

## 2022-08-15 ASSESSMENT — PAIN - FUNCTIONAL ASSESSMENT: PAIN_FUNCTIONAL_ASSESSMENT: NONE - DENIES PAIN

## 2022-08-15 ASSESSMENT — LIFESTYLE VARIABLES: SMOKING_STATUS: 1

## 2022-08-15 NOTE — PROCEDURES
Endoscopy Note    Operative Report    Patient: Kalia Melgoza MRN: [id number]      YOB: 1960  Age: 60 y.o. Sex: male            Indications: biliary obstruction , abnormal CT scan of abdomen    Preoperative Evaluation: The patient was evaluated prior to the procedure in the GI lab admission area, the patient ASA was recorded . Consent was obtained from the patient with the risk of perforation,bleeding, aspiration cholangitis and pancreatitis was discussed at length. Anesthesia: SILVIA-per anesthesia    Complications: None; patient tolerated the procedure well. EBL -insignificant      Procedure: The patient was sedated into the prone position, scope was advanced from the mouth to the second portion of the duodenum. The duodenoscope was positioned across the major papilla. Cannulation was started wire-guided. Findings: Normal papilla with no major bile flow noted. Easy cannulation achieved from the second attempt. No pancreatogram was performed. Cholangiogram showed a normal bile duct and common hepatic distally. There was a tight stricture in the proximal CHD with dilation of the right intrahepatic ducts. The left intrahepatic ducts were completely occluded no dye penetrated the left side of the liver. A large papillotomy was cut. This spyglass choledocho scope was introduced and showing the area of stricture corresponding to an ulcerated mass lesion that was biopsied. A 0.3 wire was later introduced into the right intrahepatic biliary tree and a 10 [place] stent 12 cm across was placed across the stricture      Postoperative Diagnosis: Malignant biliary stricture rule out cholangiocarcinoma    32514 Endoscopic retrograde cholangiopancreatography (ERCP) with placement of endoscopic stent , papillotomy, choledochoscopy and biopsy      Recommendations:   - Await pathology.       Signed By:  Rosemarie Jenkins MD     August 15, 2022

## 2022-08-15 NOTE — ANESTHESIA POSTPROCEDURE EVALUATION
Department of Anesthesiology  Postprocedure Note    Patient: Jaun Sung  MRN: [id number]  YOB: 1960  Date of evaluation: 8/15/2022      Procedure Summary     Date: 08/15/22 Room / Location: [facility] ENDO FLOURO 1 / [facility] ENDOSCOPY    Anesthesia Start: 0940 Anesthesia Stop: 9473    Procedure: ERCP WITH SPHINCTEROTOMY, SPYGLASS, SPYBITE, BILIARY STENT PLACEMENT (Upper GI Region) Diagnosis:       Obstructive jaundice due to cancer ([address]      Abnormal CT scan, liver      (Obstructive jaundice due to cancer ([address] [K83.1, C80.1])      (Abnormal CT scan, liver [R93.2])    Surgeons: Tiffany Jarrett MD Responsible Provider: Tari Caballero MD    Anesthesia Type: General ASA Status: 3          Anesthesia Type: General    Javed Phase I: Javed Score: 10    Javed Phase II: Javed Score: 10      Anesthesia Post Evaluation    Patient location during evaluation: PACU  Patient participation: complete - patient participated  Level of consciousness: awake and alert  Airway patency: patent  Nausea & Vomiting: no nausea and no vomiting  Complications: no  Cardiovascular status: hemodynamically stable  Respiratory status: acceptable, nonlabored ventilation and spontaneous ventilation  Hydration status: euvolemic  Comments: /75   Pulse 78   Temp 98 °F (36.7 °C) (Skin)   Resp 20   Ht 5' 10\" (1.778 m)   Wt 124 lb (56.2 kg)   SpO2 96%   BMI 17.79 kg/m²     Multimodal analgesia pain management approach

## 2022-08-15 NOTE — INTERVAL H&P NOTE
Update History & Physical    The patient's History and Physical of August 9,  was reviewed with the patient and I examined the patient. There was no change. The surgical site was confirmed by the patient and me. Plan: The risks, benefits, expected outcome, and alternative to the recommended procedure have been discussed with the patient. Patient understands and wants to proceed with the procedure.      Electronically signed by Fern Lau MD on 8/15/2022 at 9:02 AM

## 2022-08-15 NOTE — DISCHARGE INSTRUCTIONS
Endoscopic Retrograde cholangiopancreatography      1. Call Dr. Denis Brady at [phone] for any problems or questions. 2. Contact the doctor's office for follow up appointment as directed. 3. Medication may cause drowsiness for several hours, therefore:  Do not drive or operate machinery for remainder of the day. No alcohol today. Do not make any important or legal decisions for 24 hours. Do not sign any legal documents for 24 hours. 5. Ordinarily, you may resume regular diet and activity after exam unless otherwise specified by your physician. 6. For mild soreness in your throat you may use Cepacol throat lozenges or warm salt-water gargles as needed. Any additional instructions:  Office will follow up with you.

## 2022-08-15 NOTE — ANESTHESIA PRE PROCEDURE
Department of Anesthesiology  Preprocedure Note       Name:  Huong Mendoza   Age:  60 y.o.  :  1960                                          MRN:  [id number]         Date:  8/15/2022      Surgeon: Naa Gary):  Gab Mcdowell MD    Procedure: Procedure(s):  ERCP STONE REMOVAL Spyglass  Rep will be present    Medications prior to admission:   Prior to Admission medications    Medication Sig Start Date End Date Taking?  Authorizing Provider   ondansetron (ZOFRAN) 4 MG tablet Take 1 tablet by mouth 2 times daily as needed for Nausea or Vomiting  Patient not taking: Reported on 8/15/2022 8/10/22   Niurka Schneider DO       Current medications:    Current Facility-Administered Medications   Medication Dose Route Frequency Provider Last Rate Last Admin    meperidine (DEMEROL) injection 12.5 mg  12.5 mg IntraVENous Q5 Min PRN Ruba Robledo MD        HYDROmorphone HCl PF (DILAUDID) injection 0.5 mg  0.5 mg IntraVENous Q5 Min PRN Ruba Robledo MD        HYDROmorphone HCl PF (DILAUDID) injection 0.5 mg  0.5 mg IntraVENous Q5 Min PRN Ruba Robledo MD        oxyCODONE (ROXICODONE) immediate release tablet 5 mg  5 mg Oral Once PRN Ruba Robledo MD        ondansetron [facility]) injection 4 mg  4 mg IntraVENous Once PRN Ruba Robledo MD        prochlorperazine (COMPAZINE) injection 5 mg  5 mg IntraVENous Once PRN Ruba Robledo MD        lidocaine 1 % injection 1 mL  1 mL IntraDERmal Once PRN Ruba Robledo MD        acetaminophen (TYLENOL) tablet 1,000 mg  1,000 mg Oral Once Ruba Robledo MD        lactated ringers infusion   IntraVENous Continuous Ruba Robledo  mL/hr at 08/15/22 0905 New Bag at 08/15/22 0905    sodium chloride flush 0.9 % injection 5-40 mL  5-40 mL IntraVENous 2 times per day Ruba Robledo MD        sodium chloride flush 0.9 % injection 5-40 mL  5-40 mL IntraVENous PRN Ruba Robledo MD        0.9 % sodium chloride infusion   IntraVENous PRN Ruba Robledo MD        midazolam PF (VERSED) injection 2 mg  2 mg IntraVENous Once PRN Kiya Mosquera MD        ciprofloxacin (CIPRO) IVPB 400 mg  400 mg IntraVENous Q12H Aguilar Rhoades MD        sodium chloride flush 0.9 % injection 5-40 mL  5-40 mL IntraVENous 2 times per day Aguilar Rhoades MD        sodium chloride flush 0.9 % injection 5-40 mL  5-40 mL IntraVENous PRN Aguilar Rhoades MD        0.9 % sodium chloride infusion  25 mL IntraVENous PRN Aguilar Rhoades MD           Allergies:     Allergies   Allergen Reactions    Naproxen Sodium Rash     Aleve OTC years ago       Problem List:    Patient Active Problem List   Diagnosis Code    Tobacco abuse Z72.0    Obstructive jaundice due to cancer ([facility] K83.1, C80.1    Abnormal CT scan, liver R93.2       Past Medical History:        Diagnosis Date    Obstructive jaundice due to cancer ([facility] 8/10/2022    Tobacco abuse 8/22/2016    Tubular adenoma of colon        Past Surgical History:        Procedure Laterality Date    COLONOSCOPY  10/2021    1 yr recall        Social History:    Social History     Tobacco Use    Smoking status: Every Day     Packs/day: 1.00     Years: 50.00     Pack years: 50.00     Types: Cigarettes    Smokeless tobacco: Never   Substance Use Topics    Alcohol use: Not Currently                                Ready to quit: Not Answered  Counseling given: Not Answered      Vital Signs (Current):   Vitals:    08/11/22 1511 08/15/22 0859 08/15/22 0905 08/15/22 0906   BP:  115/73     Pulse:  88     Resp:  16     Temp:   98.1 °F (36.7 °C)    SpO2:    96%   Weight: 124 lb (56.2 kg)   124 lb (56.2 kg)   Height: 5' 10\" (1.778 m)   5' 10\" (1.778 m)                                              BP Readings from Last 3 Encounters:   08/15/22 115/73   08/09/22 103/67   07/26/22 110/66       NPO Status: Time of last liquid consumption: 2200                        Time of last solid consumption: 2200                        Date of last liquid consumption: 08/14/22                        Date of last solid food consumption: 08/14/22    BMI:   Wt Readings from Last 3 Encounters:   08/15/22 124 lb (56.2 kg)   08/09/22 126 lb (57.2 kg)   08/05/22 130 lb (59 kg)     Body mass index is 17.79 kg/m². CBC:   Lab Results   Component Value Date/Time    WBC 11.3 07/26/2022 02:56 PM    RBC 4.82 07/26/2022 02:56 PM    HGB 15.3 07/26/2022 02:56 PM    HCT 44.8 07/26/2022 02:56 PM    MCV 92.9 07/26/2022 02:56 PM    RDW 14.6 07/26/2022 02:56 PM     07/26/2022 02:56 PM       CMP:   Lab Results   Component Value Date/Time     07/26/2022 02:56 PM    K 4.6 07/26/2022 02:56 PM     07/26/2022 02:56 PM    CO2 29 07/26/2022 02:56 PM    BUN 12 07/26/2022 02:56 PM    CREATININE 0.90 07/26/2022 02:56 PM    GFRAA >60 07/26/2022 02:56 PM    AGRATIO 2.6 06/28/2021 04:45 PM    LABGLOM >60 07/26/2022 02:56 PM    GLUCOSE 89 07/26/2022 02:56 PM    PROT 6.3 07/26/2022 02:56 PM    CALCIUM 9.6 07/26/2022 02:56 PM    BILITOT 8.5 07/26/2022 02:56 PM    ALKPHOS 892 07/26/2022 02:56 PM    ALKPHOS 147 06/28/2021 04:45 PM     07/26/2022 02:56 PM     07/26/2022 02:56 PM       POC Tests: No results for input(s): POCGLU, POCNA, POCK, POCCL, POCBUN, POCHEMO, POCHCT in the last 72 hours.     Coags: No results found for: PROTIME, INR, APTT    HCG (If Applicable): No results found for: PREGTESTUR, PREGSERUM, HCG, HCGQUANT     ABGs: No results found for: PHART, PO2ART, QGJ1NLJ, TTF8AJB, BEART, F6XJTABX     Type & Screen (If Applicable):  No results found for: LABABO, LABRH    Drug/Infectious Status (If Applicable):  Lab Results   Component Value Date/Time    HEPCAB NONREACTIVE 07/27/2022 10:13 AM       COVID-19 Screening (If Applicable): No results found for: COVID19        Anesthesia Evaluation  Patient summary reviewed  Airway: Mallampati: II  TM distance: >3 FB   Neck ROM: full     Dental:    (+) poor dentition  Comment: Several missing and chipped teeth    Pulmonary:normal exam  breath sounds clear to auscultation  (+) current smoker                           Cardiovascular:Negative CV ROS  Exercise tolerance: good (>4 METS),       (-) murmur      Rhythm: regular  Rate: normal                 ROS comment: Pt denies recent CP, SOB or changes in functional status     Neuro/Psych:   Negative Neuro/Psych ROS              GI/Hepatic/Renal:            ROS comment: Obstructive Jaundice. Endo/Other:    (+) malignancy/cancer (Obstructive jaundice 2/2 cancer. Thought to be cholangiocarcinoma. ). Abdominal:              PE comment: Deferred   Vascular: negative vascular ROS. Other Findings:           Anesthesia Plan      general     ASA 3       Induction: intravenous. Anesthetic plan and risks discussed with patient.                         Rafael Gray MD   8/15/2022

## 2022-08-15 NOTE — ANESTHESIA PROCEDURE NOTES
Airway  Date/Time: 8/15/2022 9:53 AM  Urgency: elective    Airway not difficult    General Information and Staff    Patient location during procedure: OR  Anesthesiologist: Radha Lockett MD  Resident/CRNA: DARA De León - CRNA  Performed: resident/CRNA     Indications and Patient Condition  Indications for airway management: anesthesia  Spontaneous Ventilation: absent  Sedation level: deep  Preoxygenated: yes  Patient position: sniffing  MILS not maintained throughout  Mask difficulty assessment: not attempted    Final Airway Details  Final airway type: endotracheal airway      Successful airway: ETT  Cuffed: yes   Successful intubation technique: direct laryngoscopy  Endotracheal tube insertion site: oral  Blade: Jaramillo  Blade size: #3  ETT size (mm): 7.0  Cormack-Lehane Classification: grade I - full view of glottis  Placement verified by: chest auscultation and capnometry   Measured from: lips  ETT to lips (cm): 23  Number of attempts at approach: 1  Ventilation between attempts: none  Number of other approaches attempted: 0    Additional Comments  Pre O2 x 5 minutes. Atraumatic intubation noted above. Teeth and lip trauma free as noted preop.

## 2022-08-16 ENCOUNTER — TELEPHONE (OUTPATIENT)
Dept: GASTROENTEROLOGY | Age: 62
End: 2022-08-16

## 2022-08-16 DIAGNOSIS — C80.1 OBSTRUCTIVE JAUNDICE DUE TO CANCER (HCC): Primary | ICD-10-CM

## 2022-08-16 DIAGNOSIS — K83.1 OBSTRUCTIVE JAUNDICE DUE TO CANCER (HCC): Primary | ICD-10-CM

## 2022-08-16 LAB
CYTOLOGY-NON GYN: NORMAL
SPECIMEN SOURCE: NORMAL

## 2022-08-16 RX ORDER — CIPROFLOXACIN 500 MG/1
500 TABLET, FILM COATED ORAL 2 TIMES DAILY
Qty: 14 TABLET | Refills: 0 | Status: SHIPPED | OUTPATIENT
Start: 2022-08-16 | End: 2022-08-23

## 2022-08-16 NOTE — TELEPHONE ENCOUNTER
Patient called and had a procedure yesterday and [facility] and [facility] haven't [facility]. [facility]  [address]   Sohail Almaraz  [phone]

## 2022-08-22 ENCOUNTER — TELEPHONE (OUTPATIENT)
Dept: GASTROENTEROLOGY | Age: 62
End: 2022-08-22

## 2022-08-22 NOTE — TELEPHONE ENCOUNTER
Patient called and said that he is waiting to here about an appointment that was suppose to be set up by another dr for his next procedure and wants the date, time, and name of the dr.that he will be seeing. Also, would like the number to the practice.

## 2022-08-23 ENCOUNTER — TELEPHONE (OUTPATIENT)
Dept: FAMILY MEDICINE CLINIC | Facility: CLINIC | Age: 62
End: 2022-08-23

## 2022-08-23 ENCOUNTER — PATIENT MESSAGE (OUTPATIENT)
Dept: GASTROENTEROLOGY | Age: 62
End: 2022-08-23

## 2022-08-24 NOTE — TELEPHONE ENCOUNTER
I have completed Short Term disability paperwork for 3-6 months. Changes can be made as treatment plan is determined. May need to have specialists add to/change it in the future. I spoke with pt yesterday who states he is expecting an upcoming surgery and observation admission to [facility] in very near future. He was contacted by surgeon yesterday.

## 2022-08-25 NOTE — TELEPHONE ENCOUNTER
Patient was seen in [address] [place] [address], Dr Aida Reyes  ERCP/CHEL: complete left IHD obstruction, biopsy by FNA/EUS of mass lesion. R-IHD stent replaced.   Await path, need Oncology

## 2022-11-16 ENCOUNTER — TELEPHONE (OUTPATIENT)
Dept: FAMILY MEDICINE CLINIC | Facility: CLINIC | Age: 62
End: 2022-11-16

## 2022-11-16 NOTE — TELEPHONE ENCOUNTER
----- Message from Gavino Ortizs sent at 11/16/2022 10:58 AM EST -----  Subject: Message to Provider    QUESTIONS  Information for Provider? Patient is out of town until January.  ---------------------------------------------------------------------------  --------------  [address] [employer]  [phone]; OK to leave message on voicemail  ---------------------------------------------------------------------------  --------------  SCRIPT ANSWERS  Relationship to Patient?  Self

## 2023-03-22 PROBLEM — Z51.5 HOSPICE CARE PATIENT: Status: ACTIVE | Noted: 2023-01-01

## (undated) DEVICE — FG-44NR-1 ROTATABLE RAT TOOTH GRASP FORC: Brand: ROTATABLE GRASPING FORCEPS

## (undated) DEVICE — SYRINGE, LUER SLIP, STERILE, 60ML: Brand: MEDLINE

## (undated) DEVICE — LUBE JELLY FOIL PACK 1.4 OZ: Brand: MEDLINE INDUSTRIES, INC.

## (undated) DEVICE — CONTAINER FORMALIN PFILLED 10% NBF 40ML

## (undated) DEVICE — SYRINGE MED 3ML CLR PLAS STD N CTRL LUERLOCK TIP DISP

## (undated) DEVICE — YANKAUER,BULB TIP,W/O VENT,RIGID,STERILE: Brand: MEDLINE

## (undated) DEVICE — SINGLE-USE BIOPSY FORCEPS: Brand: SPYBITE MAX

## (undated) DEVICE — AIRLIFE™ OXYGEN TUBING 7 FEET (2.1 M) CRUSH RESISTANT OXYGEN TUBING, VINYL TIPPED: Brand: AIRLIFE™

## (undated) DEVICE — GUIDEWIRE ENDO L260CM DIA0.035IN BILI STD HI PERF STR RND

## (undated) DEVICE — SINGLE PORT MANIFOLD: Brand: NEPTUNE 2

## (undated) DEVICE — KENDALL RADIOLUCENT FOAM MONITORING ELECTRODE RECTANGULAR SHAPE: Brand: KENDALL

## (undated) DEVICE — GARMENT,MEDLINE,DVT,INT,CALF,MED, GEN2: Brand: MEDLINE

## (undated) DEVICE — ACCESS AND DELIVERY CATHETER: Brand: SPYSCOPE™ DS II

## (undated) DEVICE — SYRINGE MED 10ML LUERLOCK TIP W/O SFTY DISP

## (undated) DEVICE — THE TORRENT IRRIGATION TUBING IS INTENDED TO PROVIDE IRRIGATION VIA IRRIGATION FLUIDS, SUCH AS STERILE WATER, DURING GASTROINTESTINAL ENDOSCOPIC PROCEDURES WHEN USED IN CONJUNCTION WITH AN IRRIGATION PUMP OR ELECTROSURGICAL UNIT.: Brand: TORRENT

## (undated) DEVICE — BLOCK BITE AD 60FR W/ VELC STRP ADDRESSES MOST PT AND

## (undated) DEVICE — GAUZE,SPONGE,4"X4",12PLY,WOVEN,NS,LF: Brand: MEDLINE

## (undated) DEVICE — ELECTRODE PT RET AD L9FT HI MOIST COND ADH HYDRGEL CORDED

## (undated) DEVICE — SPHINCTEROTOME: Brand: JAGTOME RX 44

## (undated) DEVICE — NEEDLE SYR 18GA L1.5IN RED PLAS HUB S STL BLNT FILL W/O

## (undated) DEVICE — CANNULA NSL ORAL AD FOR CAPNOFLEX CO2 O2 AIRLFE

## (undated) DEVICE — 1200 GUARD II KIT W/5MM TUBE W/O VAC TUBE: Brand: GUARDIAN

## (undated) DEVICE — CONNECTOR TBNG OD5-7MM O2 END DISP